# Patient Record
Sex: MALE
[De-identification: names, ages, dates, MRNs, and addresses within clinical notes are randomized per-mention and may not be internally consistent; named-entity substitution may affect disease eponyms.]

---

## 2017-08-17 PROBLEM — Z00.00 ENCOUNTER FOR PREVENTIVE HEALTH EXAMINATION: Status: ACTIVE | Noted: 2017-08-17

## 2017-08-29 ENCOUNTER — APPOINTMENT (OUTPATIENT)
Dept: ORTHOPEDIC SURGERY | Facility: CLINIC | Age: 37
End: 2017-08-29
Payer: OTHER MISCELLANEOUS

## 2017-08-29 DIAGNOSIS — F17.200 NICOTINE DEPENDENCE, UNSPECIFIED, UNCOMPLICATED: ICD-10-CM

## 2017-08-29 DIAGNOSIS — Z86.79 PERSONAL HISTORY OF OTHER DISEASES OF THE CIRCULATORY SYSTEM: ICD-10-CM

## 2017-08-29 DIAGNOSIS — Z78.9 OTHER SPECIFIED HEALTH STATUS: ICD-10-CM

## 2017-08-29 PROCEDURE — 99243 OFF/OP CNSLTJ NEW/EST LOW 30: CPT

## 2017-08-29 PROCEDURE — 73070 X-RAY EXAM OF ELBOW: CPT | Mod: 50

## 2017-08-29 RX ORDER — LOSARTAN POTASSIUM 50 MG/1
50 TABLET, FILM COATED ORAL
Refills: 0 | Status: ACTIVE | COMMUNITY

## 2017-10-03 ENCOUNTER — MOBILE ON CALL (OUTPATIENT)
Age: 37
End: 2017-10-03

## 2017-10-18 ENCOUNTER — APPOINTMENT (OUTPATIENT)
Dept: ORTHOPEDIC SURGERY | Facility: CLINIC | Age: 37
End: 2017-10-18
Payer: OTHER MISCELLANEOUS

## 2017-10-18 DIAGNOSIS — S50.11XA CONTUSION OF RIGHT FOREARM, INITIAL ENCOUNTER: ICD-10-CM

## 2017-10-18 DIAGNOSIS — M75.21 BICIPITAL TENDINITIS, RIGHT SHOULDER: ICD-10-CM

## 2017-10-18 PROCEDURE — 99214 OFFICE O/P EST MOD 30 MIN: CPT

## 2017-10-24 ENCOUNTER — CLINICAL ADVICE (OUTPATIENT)
Age: 37
End: 2017-10-24

## 2017-10-26 ENCOUNTER — APPOINTMENT (OUTPATIENT)
Dept: ORTHOPEDIC SURGERY | Facility: CLINIC | Age: 37
End: 2017-10-26

## 2020-03-16 ENCOUNTER — APPOINTMENT (OUTPATIENT)
Dept: ORTHOPEDIC SURGERY | Facility: CLINIC | Age: 40
End: 2020-03-16

## 2020-03-16 ENCOUNTER — APPOINTMENT (OUTPATIENT)
Dept: ORTHOPEDIC SURGERY | Facility: CLINIC | Age: 40
End: 2020-03-16
Payer: OTHER MISCELLANEOUS

## 2020-03-16 VITALS — BODY MASS INDEX: 24.34 KG/M2 | WEIGHT: 170 LBS | RESPIRATION RATE: 16 BRPM | HEIGHT: 70 IN

## 2020-03-16 PROCEDURE — 73140 X-RAY EXAM OF FINGER(S): CPT | Mod: F2

## 2020-03-16 PROCEDURE — 99213 OFFICE O/P EST LOW 20 MIN: CPT

## 2020-05-05 ENCOUNTER — APPOINTMENT (OUTPATIENT)
Dept: ORTHOPEDIC SURGERY | Facility: CLINIC | Age: 40
End: 2020-05-05
Payer: OTHER MISCELLANEOUS

## 2020-05-05 VITALS — WEIGHT: 170 LBS | HEIGHT: 70 IN | BODY MASS INDEX: 24.34 KG/M2

## 2020-05-05 DIAGNOSIS — M20.012 MALLET FINGER OF LEFT FINGER(S): ICD-10-CM

## 2020-05-05 PROCEDURE — 99214 OFFICE O/P EST MOD 30 MIN: CPT

## 2020-05-18 VITALS — HEIGHT: 70 IN | WEIGHT: 170 LBS | RESPIRATION RATE: 16 BRPM | BODY MASS INDEX: 24.34 KG/M2

## 2020-05-19 ENCOUNTER — APPOINTMENT (OUTPATIENT)
Dept: ORTHOPEDIC SURGERY | Facility: CLINIC | Age: 40
End: 2020-05-19
Payer: OTHER MISCELLANEOUS

## 2020-05-19 DIAGNOSIS — M20.012 MALLET FINGER OF LEFT FINGER(S): ICD-10-CM

## 2020-05-19 PROCEDURE — 99214 OFFICE O/P EST MOD 30 MIN: CPT

## 2022-08-18 ENCOUNTER — APPOINTMENT (OUTPATIENT)
Dept: ORTHOPEDIC SURGERY | Facility: CLINIC | Age: 42
End: 2022-08-18

## 2022-08-18 VITALS — BODY MASS INDEX: 24.34 KG/M2 | WEIGHT: 170 LBS | RESPIRATION RATE: 16 BRPM | HEIGHT: 70 IN

## 2022-08-18 PROCEDURE — 73130 X-RAY EXAM OF HAND: CPT | Mod: 50

## 2022-08-18 PROCEDURE — 99072 ADDL SUPL MATRL&STAF TM PHE: CPT

## 2022-08-18 PROCEDURE — 73070 X-RAY EXAM OF ELBOW: CPT | Mod: LT

## 2022-08-18 PROCEDURE — 99213 OFFICE O/P EST LOW 20 MIN: CPT

## 2022-08-18 NOTE — PHYSICAL EXAM
[de-identified] : There is swelling and ecchymosis over the medial lateral aspect of the left elbow and tenderness over the radiocapitellar joint without evidence of crepitus with range of motion.  The biceps and triceps are intact and nontender and there is no evidence of instability through its limited range of motion.\par \par Extension/flexion of the right elbow is 0/160 as compared to 45/120 of the left with soft endpoints.\par \par Pronation/supination is 80/80 on the right as compared to 60/50 on the left\par \par There is no tenderness over the TFCC radial ulnar joint pisotriquetral or hamate hook but there is mild tenderness over the base of the fourth and fifth metacarpal bases as well as CMC joint without evidence of instability he is able to make a full fist without evidence of malrotation.  No tenderness left fourth and fifth metacarpals and CMC joint without evidence of instability.\par \par There is 2+ pulses of the radial arteries bilaterally. Wicho's test shows excellent flow through the radial and ulnar arteries with an intact arch bilaterally.\par \par There is good capillary refill of the digits bilaterally.There is no masses or sensitivity over the median and ulnar nerves at the level of the wrist. There is a negative Tinel's and negative Phalen's sign bilaterally. The sensation is grossly intact bilaterally. [de-identified] : PA and lateral of the left elbow shows a nondisplaced left radial neck fracture with intra-articular extension\par \par PA lateral and oblique of both wrist shows joint spaces to be well preserved without evidence of fractures or dislocations.  70 supinated view shows the base of the fourth and fifth CMC joints without evidence of fracture or dislocation.\par \par Carpal tunnel view of the left wrist shows no evidence of trapezial ridge fracture and the hamate hook shows no evidence of fracture

## 2022-08-18 NOTE — ASSESSMENT
[FreeTextEntry1] : 3 days status post nondisplaced left radial head fracture with intra-articular extension\par \par Left fourth and fifth CMC sprain possible occult fracture\par \par The risks, benefits, alternatives and associated differential diagnosis was discussed with the patient. Options ranged from conservative care, therapy to surgical intervention were reviewed and all questions answered. Risks included incomplete resolution of symptoms, worsening of symptoms recurrence, tissue loss, functional loss and other risks associated with treatment of this condition Patient appeared to have an excellent understanding of the risks as well as differential diagnosis associated with this condition.\par \par Patient elected a left wrist splint and a home program for the left elbow to reduce the risk of displacement\par \par The radial head fracture generally takes 4 to 6 weeks to heal followed by a period of therapy to restore range of motion and strength.\par \par Patient states that there is no light duty at work and therefore he will be remaining out of work for the next 6 weeks.\par \par He will return to the office in 2 weeks for reevaluation to ensure no further displacement occurs earlier if there are signs or symptoms of a problem which were discussed.

## 2022-08-18 NOTE — HISTORY OF PRESENT ILLNESS
[Right] : right hand dominant [FreeTextEntry1] : Patient presents for evaluation of Left elbow and volar wrist pain s/p slipping on oil at work. DOI: 8/15/22. It has  been 3 days since the injury. He states he has been using a sling to support elbow. \par \par He states he went to [facility] in [place] and Xrays done showed nondispalced radial head fracture\par \par Patient works at [employer] at [employer]  and has been out of work since Monday 8/15/22.

## 2022-09-01 ENCOUNTER — APPOINTMENT (OUTPATIENT)
Dept: ORTHOPEDIC SURGERY | Facility: CLINIC | Age: 42
End: 2022-09-01
Payer: OTHER MISCELLANEOUS

## 2022-09-01 VITALS — HEIGHT: 70 IN | WEIGHT: 170 LBS | RESPIRATION RATE: 16 BRPM | BODY MASS INDEX: 24.34 KG/M2

## 2022-09-01 DIAGNOSIS — M25.522 PAIN IN LEFT ELBOW: ICD-10-CM

## 2022-09-01 PROCEDURE — 99213 OFFICE O/P EST LOW 20 MIN: CPT

## 2022-09-01 PROCEDURE — 73070 X-RAY EXAM OF ELBOW: CPT | Mod: LT

## 2022-09-01 PROCEDURE — 99072 ADDL SUPL MATRL&STAF TM PHE: CPT

## 2022-09-01 PROCEDURE — 73110 X-RAY EXAM OF WRIST: CPT | Mod: LT

## 2022-09-01 NOTE — HISTORY OF PRESENT ILLNESS
[Has the patient missed work because of the injury/illness?] : The patient has missed work because of the injury/illness. [No] : The patient is currently not working. [FreeTextEntry1] : DOI-8/15/22- \par 2 weeks 3 days s/p nondisplaced left radial head fracture with intra-articular extension and Left fourth and fifth CMC sprain possible occult fracture.\par Patient continues to wear his splint throughout the day and night. 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

## 2022-09-01 NOTE — ASSESSMENT
[FreeTextEntry1] : 2-1/2 weeks status post left elbow radial head fracture with maintained alignment and improve range of motion.\par \par He will continue his home program to reduce the risk of further displacement.\par \par Patient continues to have pain over the left fourth and fifth CMC joint as well as the hamate with 2 sets of negative x-rays.  This may represent an occult fracture versus a sprain.\par \par Since the treatment of these 2 conditions are different we will proceed with an MRI to help with the differential diagnosis.\par \par He will continue with immobilization of the left wrist until a definitive diagnosis is made and based on the results a definitive plan will be developed.\par \par He is unable to use the left upper extremity for any lifting but is capable of using the right upper extremity.\par \par He will remain out of work if there is no work available involving no use of the left upper extremity for a minimum of 4 weeks and at that time will be reevaluated.\par \par We will contact the patient after the MRI of the left wrist to discuss.\par \par He will follow in the office in 2 weeks to reassess the left radial head fracture.

## 2022-09-01 NOTE — PHYSICAL EXAM
[de-identified] : Physical exam shows the patient to be alert and oriented x3, capable of ambulation. The patient is well-developed and well-nourished in no apparent respiratory distress. Majority of the skin is intact bilaterally in the upper extremities without lymphadenopathy at the elbows.\par \par There is a residual effusion in the left elbow but no evidence of infection.  No evidence of instability through its limited range of motion and biceps and triceps intact.\par \par Range of motion of the elbow extension/flexion of the right elbow 0/160 left elbow 30/150\par Pronation/supination is 70/70 symmetric bilaterally\par \par The wrist has no tenderness over the scaphoid lunate or triquetrum but there is tenderness over the volar and dorsal aspect of the hamate specifically over the hamate hook and near the CMC joints.  No evidence of instability.\par \par There is 2+ pulses of the radial arteries bilaterally. Wicho's test shows excellent flow through the radial and ulnar arteries with an intact arch bilaterally.\par \par There is good capillary refill of the digits bilaterally.There is no masses or sensitivity over the median and ulnar nerves at the level of the wrist. There is a negative Tinel's and negative Phalen's sign bilaterally. The sensation is grossly intact bilaterally. [de-identified] : PA and lateral of the left elbow shows a intra-articular left radial head fracture without significant change in alignment as compared to previous x-rays.\par \par PA lateral and oblique of the left hand shows no evidence of fractures or dislocations with good alignment and position.

## 2022-09-09 ENCOUNTER — APPOINTMENT (OUTPATIENT)
Dept: ORTHOPEDIC SURGERY | Facility: CLINIC | Age: 42
End: 2022-09-09

## 2022-09-09 PROCEDURE — 99442: CPT

## 2022-09-15 ENCOUNTER — APPOINTMENT (OUTPATIENT)
Dept: ORTHOPEDIC SURGERY | Facility: CLINIC | Age: 42
End: 2022-09-15

## 2022-09-15 VITALS — RESPIRATION RATE: 16 BRPM | WEIGHT: 170 LBS | BODY MASS INDEX: 24.34 KG/M2 | HEIGHT: 70 IN

## 2022-09-15 DIAGNOSIS — M25.532 PAIN IN LEFT WRIST: ICD-10-CM

## 2022-09-15 DIAGNOSIS — S52.122D DISPLACED FRACTURE OF HEAD OF LEFT RADIUS, SUBSEQUENT ENCOUNTER FOR CLOSED FRACTURE WITH ROUTINE HEALING: ICD-10-CM

## 2022-09-15 PROCEDURE — 99213 OFFICE O/P EST LOW 20 MIN: CPT

## 2022-09-15 PROCEDURE — 99072 ADDL SUPL MATRL&STAF TM PHE: CPT

## 2022-09-15 PROCEDURE — 73110 X-RAY EXAM OF WRIST: CPT | Mod: LT

## 2022-09-15 PROCEDURE — 73070 X-RAY EXAM OF ELBOW: CPT | Mod: LT

## 2022-09-15 NOTE — ASSESSMENT
[FreeTextEntry1] : 4 weeks status post left radial head fracture and nondisplaced left hamate hook fracture.\par \par The radial head fracture appears to be healed with minimal residual stiffness which is improving over time.\par \par A home program was outlined for this condition.\par \par Options were discussed for the left hamate hook fracture ranging from conservative management to surgical excision.\par \par Since the patient is not involved in any racquet handling sports he would like to try to get it to heal with conservative management.\par \par He will continue his splinting and home program and be reassessed in 4 weeks.\par \par Continue out of work status since he should not be lifting with the left upper extremity due to the hamate hook fracture.\par \par Work status will be reassessed on follow-up visit in 4 weeks.\par \par Once union is achieved and discomfort resolves he will be cleared for work after confirmation with CT scan.\par \par If the bone does not heal by 3 months he is considering excision of the left hamate hook fracture.

## 2022-09-15 NOTE — HISTORY OF PRESENT ILLNESS
[FreeTextEntry1] : DOI-8/15/22\par 4 weeks 3 days s/p- nondisplaced left radial head fracture with intra-articular extension and Left fourth and fifth CMC sprain possible occult fracture. PAtient continues to wear his wrist splint throughout the day and night\par

## 2022-09-15 NOTE — PHYSICAL EXAM
[de-identified] : There is no tenderness over the fracture site of the left radial head and no crepitus upon motion no evidence of elbow instability.\par \par Range of motion of the left elbow 10/150 as compared to 0/160 of the opposite side with soft endpoints without evidence of instability and no effusion.\par Pronation/supination 80/80 symmetric bilaterally\par \par There continues to be tenderness over the hamate hook of the left wrist but no tenderness over the pisiform.  No tenderness over the scaphoid scapholunate lunotriquetral ligament or TFCC.\par \par The FDP and extensor mechanism to the fourth and fifth digits are intact with full range of motion of the digits.\par \par No independent function of the FDS on the left side but is present on the right.\par \par There is good capillary refill of the digits bilaterally.There is no masses or sensitivity over the median and ulnar nerves at the level of the wrist. There is a negative Tinel's and negative Phalen's sign bilaterally. The sensation is grossly intact bilaterally. [de-identified] : PA lateral of the left elbow shows no major changes in alignment with callus formation at the fracture site and left radial head.  PA lateral and oblique of the left wrist shows excellent alignment and the hamate hook fracture cannot be visualized.

## 2022-10-13 ENCOUNTER — APPOINTMENT (OUTPATIENT)
Dept: ORTHOPEDIC SURGERY | Facility: CLINIC | Age: 42
End: 2022-10-13

## 2022-10-13 VITALS — HEIGHT: 70 IN | BODY MASS INDEX: 24.34 KG/M2 | RESPIRATION RATE: 16 BRPM | WEIGHT: 170 LBS

## 2022-10-13 PROCEDURE — 73110 X-RAY EXAM OF WRIST: CPT | Mod: LT

## 2022-10-13 PROCEDURE — 99072 ADDL SUPL MATRL&STAF TM PHE: CPT

## 2022-10-13 PROCEDURE — 99213 OFFICE O/P EST LOW 20 MIN: CPT

## 2022-10-13 PROCEDURE — 73070 X-RAY EXAM OF ELBOW: CPT | Mod: LT

## 2022-10-27 ENCOUNTER — APPOINTMENT (OUTPATIENT)
Dept: ORTHOPEDIC SURGERY | Facility: CLINIC | Age: 42
End: 2022-10-27

## 2022-10-27 PROCEDURE — 99442: CPT

## 2022-11-12 ENCOUNTER — LABORATORY RESULT (OUTPATIENT)
Age: 42
End: 2022-11-12

## 2022-11-14 ENCOUNTER — TRANSCRIPTION ENCOUNTER (OUTPATIENT)
Age: 42
End: 2022-11-14

## 2022-11-14 RX ORDER — CHLORHEXIDINE GLUCONATE 213 G/1000ML
1 SOLUTION TOPICAL DAILY
Refills: 0 | Status: DISCONTINUED | OUTPATIENT
Start: 2022-11-15 | End: 2022-11-15

## 2022-11-14 RX ORDER — OXYCODONE AND ACETAMINOPHEN 5; 325 MG/1; MG/1
5-325 TABLET ORAL
Qty: 15 | Refills: 0 | Status: ACTIVE | COMMUNITY
Start: 2022-11-14 | End: 1900-01-01

## 2022-11-14 RX ORDER — CEPHALEXIN 500 MG/1
500 CAPSULE ORAL 3 TIMES DAILY
Qty: 21 | Refills: 0 | Status: ACTIVE | COMMUNITY
Start: 2022-11-14 | End: 1900-01-01

## 2022-11-14 NOTE — ASU PATIENT PROFILE, ADULT - FALL HARM RISK - RISK INTERVENTIONS

## 2022-11-14 NOTE — ASU DISCHARGE PLAN (ADULT/PEDIATRIC) - CARE PROVIDER_API CALL
Mikey Schuster  ORTHOPAEDIC SURGERY  [address] [place] [id number]  Phone: [phone]  Fax: [phone]  Established Patient  Follow Up Time:

## 2022-11-14 NOTE — ASU PATIENT PROFILE, ADULT - PATIENT’S MOTHER’S MAIDEN LAST NAME (INFO USED BY THE IMMUNIZATION REGISTRY):
-- DO NOT REPLY / DO NOT REPLY ALL --  -- Message is from the [facility]--    Order Request  Lab: INR Finger Prick     Message / reason: Make sure warfarin level okay   Patients at clinic  Insurance type: Medicare   Payor: MEDICARE / Plan: PARTA AND B / Product Type: MEDICARE    Preferred Delivery Method   Call when ready for pickup - phone number to notify: [phone]     Caller Information       Type Contact Phone    05/04/2022 01:56 PM CDT Phone (Incoming) Wendy Yuan (Emergency Contact) [phone]        Fax: [phone]    Alternative phone number: n/a     Turnaround time given to caller:   \"This message will be sent to [state Provider's name]. The clinical team will fulfill your request as soon as they review your message.\"  
INR order was in standing orders released one to be done.  
Haroldo

## 2022-11-14 NOTE — BRIEF OPERATIVE NOTE - NSICDXBRIEFPOSTOP_GEN_ALL_CORE_FT
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

## 2022-11-14 NOTE — BRIEF OPERATIVE NOTE - NSICDXBRIEFPREOP_GEN_ALL_CORE_FT
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

## 2022-11-14 NOTE — ASU DISCHARGE PLAN (ADULT/PEDIATRIC) - ASU DC SPECIAL INSTRUCTIONSFT
Co-Directors: Mikey Schuster MD; Nicolas Shah MD; Kiran Tovar MD   [facility] of [address] 	  [place] [address] 	 	  Phone [phone] (HAND), Fax [phone]    [email or web address]    Hand Surgery Post Operative Instructions      DRESSING CARE:  1.	Please keep bandage ON and DRY until you return to the office for your 1st postoperative visit.   2.	In the shower you must cover bandage with a plastic bag. You can use tape or a rubber band so no water leaks into the bag.   3.	Please do not exercise as that leads to excessive sweating as the bandage will therefore become moist/ wet.    4.	Do not remove or change your bandage. You may apply more tape if dressing starts to unravel.   5.	Please do not insert any objects, such as a pencil, down into the bandage.     ELEVATION:  1.	Keep hand/wrist above heart level at all times or until bandage feels loose. This will help with swelling of the fingers and can be accomplished by using the FOAM PILLOW.   2.	 A sling will not hold your hand/wrist above your heart and therefore its use should be limited (it may also cause shoulder stiffness).     ACTIVITY:  1.	Moving your fingers daily after surgery is very important to prevent stiffness. Please open your fingers completely and close your fingers completely to achieve full range of motion.  **UNLESS TENDON REPAIR OR NERVE REPAIR SURGERY PERFORMED    2.	Move all joints of the extremity that are not immobilized to prevent stiffness (i.e. shoulder, elbow, fingers, and thumb unless instructed otherwise).   3.	Avoid activities which may re-injure your hand or finger.     DIET:   Regular diet. Start light and progress as tolerated.     PAIN MEDICINE:   1.	Pain medicine was sent to your pharmacy.  2.	Take pain medicine on an “AS NEEDED” basis according to your doctor’s instructions.   3.	Your pain will decrease over the next few days allowing you to:   •	Decrease your pain medicine quantity until you stop.   •	Increase the time between doses until you stop.   4.	You should not drink alcoholic beverages while on pain medication.   5.	Take pain medicine with food to prevent nausea.   6.	Constipation can occur. If no bowel movement occurs within 48 hours take a laxative of your choice (over the counter).	 	      CONTACT PHYSICIAN FOR:   Slight pain, swelling and bluish discoloration are to be expected. If you have breathing difficulty or chest pain dial 911 immediately. However, if the following symptoms occur notify your physician:   •	Temperature above 101° F 	        • Inability to urinate in 8 hours   •	Uncontrolled nausea/vomiting   	• Progressively increasing pain   •	Signs of wound infection 	                • Excessive bleeding  (Redness, swelling, pus-like drainage)     • Increasing numbness   •	Excessive swelling and tightness 	• Splint or cast that is too tight      OFFICE APPOINTMENT:    A staff member from the office will call you in the next 1-2 days to schedule your 1st Post Operative appointment to see your physician back in the office. *IF someone does not reach out to you in the next 1-2 days please call the office.      Patient Name _________________________________ Signature ______________________________ Date__________    Witness _____________________________________________________ Date ______________ Patient unable to complete
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
## 2022-11-14 NOTE — ASU PATIENT PROFILE, ADULT - NS PREOP UNDERSTANDS INFO
Covid PCR test done 11/12/22 @ [facility].  NPO solids after midnight 11/14/22, clears after 7 am on DOS, no contact lenses or jewelry, bring insurance card and photo ID, Escort to bring photo ID and proof of Covid vaccine or a negative Covid test within 3 DOS, address and phone # reviewed with pt./yes

## 2022-11-14 NOTE — ASU DISCHARGE PLAN (ADULT/PEDIATRIC) - NS MD DC FALL RISK RISK
For information on Fall & Injury Prevention, visit: [email or web address] OR  [email or web address] OR  https://www.cdc.gov/steadi/patient.html

## 2022-11-15 ENCOUNTER — OUTPATIENT (OUTPATIENT)
Dept: OUTPATIENT SERVICES | Facility: HOSPITAL | Age: 42
LOS: 1 days | Discharge: HOME | End: 2022-11-15

## 2022-11-15 ENCOUNTER — APPOINTMENT (OUTPATIENT)
Dept: ORTHOPEDIC SURGERY | Facility: AMBULATORY SURGERY CENTER | Age: 42
End: 2022-11-15

## 2022-11-15 VITALS
HEIGHT: 70 IN | TEMPERATURE: 98 F | OXYGEN SATURATION: 98 % | RESPIRATION RATE: 14 BRPM | HEART RATE: 79 BPM | SYSTOLIC BLOOD PRESSURE: 141 MMHG | WEIGHT: 176.15 LBS | DIASTOLIC BLOOD PRESSURE: 91 MMHG

## 2022-11-15 VITALS
OXYGEN SATURATION: 95 % | DIASTOLIC BLOOD PRESSURE: 78 MMHG | TEMPERATURE: 98 F | SYSTOLIC BLOOD PRESSURE: 130 MMHG | RESPIRATION RATE: 16 BRPM | HEART RATE: 88 BPM

## 2022-11-15 DIAGNOSIS — Z87.39 PERSONAL HISTORY OF OTHER DISEASES OF THE MUSCULOSKELETAL SYSTEM AND CONNECTIVE TISSUE: Chronic | ICD-10-CM

## 2022-11-15 PROCEDURE — 25645 OPTX CRPL FX OTH/THN SCPH EA: CPT | Mod: LT

## 2022-11-15 RX ORDER — OXYCODONE HYDROCHLORIDE 5 MG/1
5 TABLET ORAL ONCE
Refills: 0 | Status: DISCONTINUED | OUTPATIENT
Start: 2022-11-15 | End: 2022-11-15

## 2022-11-15 RX ORDER — SODIUM CHLORIDE 9 MG/ML
1000 INJECTION, SOLUTION INTRAVENOUS
Refills: 0 | Status: DISCONTINUED | OUTPATIENT
Start: 2022-11-15 | End: 2022-11-15

## 2022-11-15 RX ORDER — ACETAMINOPHEN 500 MG
650 TABLET ORAL EVERY 6 HOURS
Refills: 0 | Status: DISCONTINUED | OUTPATIENT
Start: 2022-11-15 | End: 2022-11-15

## 2022-11-15 RX ORDER — ONDANSETRON 8 MG/1
4 TABLET, FILM COATED ORAL ONCE
Refills: 0 | Status: DISCONTINUED | OUTPATIENT
Start: 2022-11-15 | End: 2022-11-15

## 2022-11-15 NOTE — ASU PREOP CHECKLIST - NSBLOODTRANS_GEN_A_CORE_SIUH
Meloxicam prescription signed by ZAHIDA Phelan this morning.  Patient notified in voicemail.  
Patient was looking for refill of his meloxicam. He reported that he tried to contact Moon Ruffin's office and his refill was refused. He notes that his oncologist is okay with him taking it and he is wondering why it was refused. Advised him that I would send a message to internal [facility]  to see if they could reach out to him to let him know why his refill was refused.  
no...

## 2022-11-15 NOTE — PRE-ANESTHESIA EVALUATION ADULT - NSANTHADDINFOFT_GEN_ALL_CORE
Discussed risks of general anesthesia, sedation, and peripheral nerve injury-including risk of nerve injury. All questions answered and patient is in agreement for left infracl;avicular nerve block

## 2022-11-28 ENCOUNTER — APPOINTMENT (OUTPATIENT)
Dept: ORTHOPEDIC SURGERY | Facility: CLINIC | Age: 42
End: 2022-11-28

## 2022-11-28 PROCEDURE — 99024 POSTOP FOLLOW-UP VISIT: CPT

## 2022-11-28 NOTE — HISTORY OF PRESENT ILLNESS
[___ Days Post Op] : post op day #[unfilled] [de-identified] : DOS: 11/15/22 [de-identified] : 13 days status post left hamate hook excision [de-identified] : Incision line is well-healed there is no evidence of infection he is moving the digits well without evidence of crepitus or instability good capillary refill sensation grossly intact. [de-identified] : 13 days status post left hamate hook excision [de-identified] : A protective splint and home program was outlined.  Return to the office in 2 weeks\par \par Patient will be out of work for 8 weeks and be reevaluated at that time

## 2022-12-12 ENCOUNTER — APPOINTMENT (OUTPATIENT)
Dept: ORTHOPEDIC SURGERY | Facility: CLINIC | Age: 42
End: 2022-12-12

## 2022-12-12 DIAGNOSIS — S62.152K: ICD-10-CM

## 2022-12-12 PROCEDURE — 99024 POSTOP FOLLOW-UP VISIT: CPT

## 2022-12-12 NOTE — HISTORY OF PRESENT ILLNESS
[___ Months Post Op] : [unfilled] months post op [de-identified] : DOS: 11/15/22 [de-identified] : 1 month status post left hamate hook excision [de-identified] : Scar is well-healed with minimal sensitivity\par \par Pronation/supination 80/80\par Flexion/extension 80/60 symmetric bilaterally\par \par  strength 90 pounds on the right 65 pounds on the left\par Right-hand-dominant\par \par There is good capillary refill of the digits bilaterally.There is no masses or sensitivity over the median and ulnar nerves at the level of the wrist. There is a negative Tinel's and negative Phalen's sign bilaterally. The sensation is grossly intact bilaterally.\par  [de-identified] : 1 month status post left hamate hook excision [de-identified] : Patient will continue with independent home program\par \par Patient feels he is making progress on his own we will continue his home program.\par \par He is cleared to start work full duty without restrictions starting on December 27, 2022.\par \par Return to the office in 6 weeks

## (undated) DEVICE — TOURNIQUET CUFF 18" DUAL PORT SINGLE BLADDER W PLC  (BLACK)

## (undated) DEVICE — DRSG STERISTRIPS 0.25 X 3"

## (undated) DEVICE — GLV 8 PROTEXIS (WHITE)

## (undated) DEVICE — TOURNIQUET CUFF 24" DUAL PORT SINGLE BLADDER W PLC (BLACK)

## (undated) DEVICE — SLV COMPRESSION KNEE MED

## (undated) DEVICE — DRAPE C ARM MINI PACK FOR 6800

## (undated) DEVICE — PACK HAND

## (undated) DEVICE — BLADE SCALPEL SAFETY #15 WITH PLASTIC GREEN HANDLE

## (undated) DEVICE — SUT ETHILON 5-0 18" P-3

## (undated) DEVICE — WARMING BLANKET LOWER ADULT

## (undated) DEVICE — SUT VICRYL 4-0 18" P-3 UNDYED

## (undated) DEVICE — SUT VICRYL 4-0 18" PS-2 UNDYED

## (undated) DEVICE — DRSG KERLIX ROLL 4.5"

## (undated) DEVICE — MARKING PEN W RULER